# Patient Record
(demographics unavailable — no encounter records)

---

## 2025-03-17 NOTE — HISTORY OF PRESENT ILLNESS
[FreeTextEntry1] : [place] presents for consideration of repeat colonoscopy.  From time to time, he has noted blood on wiping, attributed to hemorrhoids.  A polyp was removed at colonoscopy in 2003, with last colonoscopy September 2021 again revealing only hemorrhoids.  Family history is negative for colorectal neoplasia.

## 2025-03-17 NOTE — PHYSICAL EXAM
[Alert] : alert [Normal Voice/Communication] : normal voice/communication [Healthy Appearing] : healthy appearing [No Acute Distress] : no acute distress [Well Developed] : well developed [Well Nourished] : well nourished [None] : no edema [Inguinal Lymph Nodes Enlarged Bilaterally] : no inguinal lymphadenopathy [Normal Color / Pigmentation] : normal skin color and pigmentation [Normal] : oriented to person, place, and time

## 2025-03-17 NOTE — ASSESSMENT
[FreeTextEntry1] : 1. Remote history of colonic polyp, with only hemorrhoids at last colonoscopy September 2021.  Episodic rectal bleeding statistically likely from internal hemorrhoids.  2. Remote history of GERD, clinically quiescent. 3. Prediabetes. 4. Hyperlipidemia. 5. BPH. 6. IgA deficiency. 7.  Erectile dysfunction.  Plan: 1.  Medical records have been reviewed. 2.  He will retrieve latest labs (from Dr. Sanford in [place]) for my review. 3.  Proceed with repeat colonoscopy 3/19 AM as pre-scheduled. Procedure, rationale, anesthesia plan, and MiraLax prep instructions were again reviewed and brochure given.